# Patient Record
Sex: FEMALE | Race: WHITE | URBAN - METROPOLITAN AREA
[De-identification: names, ages, dates, MRNs, and addresses within clinical notes are randomized per-mention and may not be internally consistent; named-entity substitution may affect disease eponyms.]

---

## 2023-03-03 LAB
C. TRACHOMATIS, EXTERNAL RESULT: NEGATIVE
N. GONORRHOEAE, EXTERNAL RESULT: NEGATIVE

## 2023-04-03 LAB
ABO, EXTERNAL RESULT: NORMAL
HEP B, EXTERNAL RESULT: NEGATIVE
HEPATITIS C ANTIBODY, EXTERNAL RESULT: NEGATIVE
HIV, EXTERNAL RESULT: NEGATIVE
RH FACTOR, EXTERNAL RESULT: POSITIVE
RPR, EXTERNAL RESULT: NON REACTIVE
RUBELLA TITER, EXTERNAL RESULT: NORMAL

## 2023-09-12 LAB — GBS, EXTERNAL RESULT: NEGATIVE

## 2023-10-06 ENCOUNTER — HOSPITAL ENCOUNTER (INPATIENT)
Age: 33
LOS: 3 days | Discharge: HOME | End: 2023-10-09
Attending: OBSTETRICS & GYNECOLOGY | Admitting: OBSTETRICS & GYNECOLOGY
Payer: COMMERCIAL

## 2023-10-06 ENCOUNTER — ANESTHESIA (OUTPATIENT)
Dept: LABOR AND DELIVERY | Age: 33
End: 2023-10-06
Payer: COMMERCIAL

## 2023-10-06 ENCOUNTER — ANESTHESIA EVENT (OUTPATIENT)
Dept: LABOR AND DELIVERY | Age: 33
End: 2023-10-06
Payer: COMMERCIAL

## 2023-10-06 PROBLEM — O36.8390 NON-REASSURING FETAL HEART TONES COMPLICATING PREGNANCY, ANTEPARTUM: Status: ACTIVE | Noted: 2023-10-06

## 2023-10-06 LAB
ABO + RH BLD: NORMAL
ALBUMIN SERPL-MCNC: 3.2 G/DL (ref 3.4–5)
ALBUMIN/GLOB SERPL: 0.8 {RATIO} (ref 1.1–2.2)
ALP SERPL-CCNC: 142 U/L (ref 40–129)
ALT SERPL-CCNC: <5 U/L (ref 10–40)
AMPHETAMINES UR QL SCN>1000 NG/ML: ABNORMAL
ANION GAP SERPL CALCULATED.3IONS-SCNC: 13 MMOL/L (ref 3–16)
AST SERPL-CCNC: 12 U/L (ref 15–37)
BARBITURATES UR QL SCN>200 NG/ML: ABNORMAL
BENZODIAZ UR QL SCN>200 NG/ML: ABNORMAL
BILIRUB SERPL-MCNC: <0.2 MG/DL (ref 0–1)
BLD GP AB SCN SERPL QL: NORMAL
BUN SERPL-MCNC: 9 MG/DL (ref 7–20)
BUPRENORPHINE+NOR UR QL SCN: ABNORMAL
CALCIUM SERPL-MCNC: 9.1 MG/DL (ref 8.3–10.6)
CANNABINOIDS UR QL SCN>50 NG/ML: ABNORMAL
CHLORIDE SERPL-SCNC: 102 MMOL/L (ref 99–110)
CO2 SERPL-SCNC: 21 MMOL/L (ref 21–32)
COCAINE UR QL SCN: ABNORMAL
CREAT SERPL-MCNC: 0.7 MG/DL (ref 0.6–1.1)
DEPRECATED RDW RBC AUTO: 14.1 % (ref 12.4–15.4)
DRUG SCREEN COMMENT UR-IMP: ABNORMAL
FENTANYL SCREEN, URINE: POSITIVE
GFR SERPLBLD CREATININE-BSD FMLA CKD-EPI: >60 ML/MIN/{1.73_M2}
GLUCOSE SERPL-MCNC: 95 MG/DL (ref 70–99)
HCT VFR BLD AUTO: 31.6 % (ref 36–48)
HGB BLD-MCNC: 10.2 G/DL (ref 12–16)
MCH RBC QN AUTO: 26.9 PG (ref 26–34)
MCHC RBC AUTO-ENTMCNC: 32.2 G/DL (ref 31–36)
MCV RBC AUTO: 83.7 FL (ref 80–100)
METHADONE UR QL SCN>300 NG/ML: ABNORMAL
OPIATES UR QL SCN>300 NG/ML: ABNORMAL
OXYCODONE UR QL SCN: ABNORMAL
PCP UR QL SCN>25 NG/ML: ABNORMAL
PH UR STRIP: 6 [PH]
PLATELET # BLD AUTO: 301 K/UL (ref 135–450)
PMV BLD AUTO: 8.4 FL (ref 5–10.5)
POTASSIUM SERPL-SCNC: 4.3 MMOL/L (ref 3.5–5.1)
PROT SERPL-MCNC: 7.3 G/DL (ref 6.4–8.2)
RBC # BLD AUTO: 3.78 M/UL (ref 4–5.2)
SODIUM SERPL-SCNC: 136 MMOL/L (ref 136–145)
WBC # BLD AUTO: 9.2 K/UL (ref 4–11)

## 2023-10-06 PROCEDURE — 85027 COMPLETE CBC AUTOMATED: CPT

## 2023-10-06 PROCEDURE — 6360000002 HC RX W HCPCS: Performed by: NURSE ANESTHETIST, CERTIFIED REGISTERED

## 2023-10-06 PROCEDURE — 86780 TREPONEMA PALLIDUM: CPT

## 2023-10-06 PROCEDURE — 2580000003 HC RX 258: Performed by: NURSE ANESTHETIST, CERTIFIED REGISTERED

## 2023-10-06 PROCEDURE — 80053 COMPREHEN METABOLIC PANEL: CPT

## 2023-10-06 PROCEDURE — 86850 RBC ANTIBODY SCREEN: CPT

## 2023-10-06 PROCEDURE — 3609079900 HC CESAREAN SECTION: Performed by: OBSTETRICS & GYNECOLOGY

## 2023-10-06 PROCEDURE — 7100000001 HC PACU RECOVERY - ADDTL 15 MIN: Performed by: OBSTETRICS & GYNECOLOGY

## 2023-10-06 PROCEDURE — 86900 BLOOD TYPING SEROLOGIC ABO: CPT

## 2023-10-06 PROCEDURE — 2580000003 HC RX 258: Performed by: OBSTETRICS & GYNECOLOGY

## 2023-10-06 PROCEDURE — 86901 BLOOD TYPING SEROLOGIC RH(D): CPT

## 2023-10-06 PROCEDURE — 6370000000 HC RX 637 (ALT 250 FOR IP): Performed by: OBSTETRICS & GYNECOLOGY

## 2023-10-06 PROCEDURE — 2709999900 HC NON-CHARGEABLE SUPPLY: Performed by: OBSTETRICS & GYNECOLOGY

## 2023-10-06 PROCEDURE — 6360000002 HC RX W HCPCS

## 2023-10-06 PROCEDURE — 7100000000 HC PACU RECOVERY - FIRST 15 MIN: Performed by: OBSTETRICS & GYNECOLOGY

## 2023-10-06 PROCEDURE — 80307 DRUG TEST PRSMV CHEM ANLYZR: CPT

## 2023-10-06 PROCEDURE — 3700000001 HC ADD 15 MINUTES (ANESTHESIA): Performed by: OBSTETRICS & GYNECOLOGY

## 2023-10-06 PROCEDURE — 3700000000 HC ANESTHESIA ATTENDED CARE: Performed by: OBSTETRICS & GYNECOLOGY

## 2023-10-06 PROCEDURE — 1220000000 HC SEMI PRIVATE OB R&B

## 2023-10-06 PROCEDURE — 88307 TISSUE EXAM BY PATHOLOGIST: CPT

## 2023-10-06 PROCEDURE — 6360000002 HC RX W HCPCS: Performed by: OBSTETRICS & GYNECOLOGY

## 2023-10-06 RX ORDER — KETOROLAC TROMETHAMINE 30 MG/ML
INJECTION, SOLUTION INTRAMUSCULAR; INTRAVENOUS
Status: COMPLETED
Start: 2023-10-06 | End: 2023-10-06

## 2023-10-06 RX ORDER — SODIUM CHLORIDE, SODIUM LACTATE, POTASSIUM CHLORIDE, AND CALCIUM CHLORIDE .6; .31; .03; .02 G/100ML; G/100ML; G/100ML; G/100ML
1000 INJECTION, SOLUTION INTRAVENOUS ONCE
Status: DISCONTINUED | OUTPATIENT
Start: 2023-10-06 | End: 2023-10-06

## 2023-10-06 RX ORDER — SODIUM CHLORIDE 0.9 % (FLUSH) 0.9 %
5-40 SYRINGE (ML) INJECTION EVERY 12 HOURS SCHEDULED
Status: DISCONTINUED | OUTPATIENT
Start: 2023-10-06 | End: 2023-10-09 | Stop reason: HOSPADM

## 2023-10-06 RX ORDER — HYDROXYZINE PAMOATE 25 MG/1
25 CAPSULE ORAL 3 TIMES DAILY PRN
Status: DISCONTINUED | OUTPATIENT
Start: 2023-10-06 | End: 2023-10-09 | Stop reason: HOSPADM

## 2023-10-06 RX ORDER — SODIUM CHLORIDE 0.9 % (FLUSH) 0.9 %
5-40 SYRINGE (ML) INJECTION PRN
Status: DISCONTINUED | OUTPATIENT
Start: 2023-10-06 | End: 2023-10-09 | Stop reason: HOSPADM

## 2023-10-06 RX ORDER — IBUPROFEN 600 MG/1
600 TABLET ORAL EVERY 8 HOURS SCHEDULED
Status: DISCONTINUED | OUTPATIENT
Start: 2023-10-06 | End: 2023-10-09 | Stop reason: HOSPADM

## 2023-10-06 RX ORDER — ONDANSETRON 2 MG/ML
4 INJECTION INTRAMUSCULAR; INTRAVENOUS EVERY 6 HOURS PRN
Status: DISCONTINUED | OUTPATIENT
Start: 2023-10-06 | End: 2023-10-06

## 2023-10-06 RX ORDER — OXYCODONE HYDROCHLORIDE 5 MG/1
10 TABLET ORAL EVERY 4 HOURS PRN
Status: DISCONTINUED | OUTPATIENT
Start: 2023-10-06 | End: 2023-10-09 | Stop reason: HOSPADM

## 2023-10-06 RX ORDER — SODIUM CHLORIDE 0.9 % (FLUSH) 0.9 %
10 SYRINGE (ML) INJECTION EVERY 12 HOURS SCHEDULED
Status: DISCONTINUED | OUTPATIENT
Start: 2023-10-06 | End: 2023-10-06

## 2023-10-06 RX ORDER — ONDANSETRON 2 MG/ML
4 INJECTION INTRAMUSCULAR; INTRAVENOUS EVERY 6 HOURS PRN
Status: DISCONTINUED | OUTPATIENT
Start: 2023-10-06 | End: 2023-10-09 | Stop reason: HOSPADM

## 2023-10-06 RX ORDER — SODIUM CHLORIDE 9 MG/ML
INJECTION, SOLUTION INTRAVENOUS PRN
Status: DISCONTINUED | OUTPATIENT
Start: 2023-10-06 | End: 2023-10-09 | Stop reason: HOSPADM

## 2023-10-06 RX ORDER — MORPHINE SULFATE 0.5 MG/ML
INJECTION, SOLUTION EPIDURAL; INTRATHECAL; INTRAVENOUS PRN
Status: DISCONTINUED | OUTPATIENT
Start: 2023-10-06 | End: 2023-10-06 | Stop reason: SDUPTHER

## 2023-10-06 RX ORDER — DOCUSATE SODIUM 100 MG/1
100 CAPSULE, LIQUID FILLED ORAL 2 TIMES DAILY PRN
Status: DISCONTINUED | OUTPATIENT
Start: 2023-10-06 | End: 2023-10-09 | Stop reason: HOSPADM

## 2023-10-06 RX ORDER — LANOLIN 100 %
OINTMENT (GRAM) TOPICAL
Status: DISCONTINUED | OUTPATIENT
Start: 2023-10-06 | End: 2023-10-09 | Stop reason: HOSPADM

## 2023-10-06 RX ORDER — BUPIVACAINE HYDROCHLORIDE 7.5 MG/ML
INJECTION, SOLUTION INTRASPINAL PRN
Status: DISCONTINUED | OUTPATIENT
Start: 2023-10-06 | End: 2023-10-06 | Stop reason: SDUPTHER

## 2023-10-06 RX ORDER — PRENATAL WITH FERROUS FUM AND FOLIC ACID 3080; 920; 120; 400; 22; 1.84; 3; 20; 10; 1; 12; 200; 27; 25; 2 [IU]/1; [IU]/1; MG/1; [IU]/1; MG/1; MG/1; MG/1; MG/1; MG/1; MG/1; UG/1; MG/1; MG/1; MG/1; MG/1
1 TABLET ORAL DAILY
Status: DISCONTINUED | OUTPATIENT
Start: 2023-10-07 | End: 2023-10-09 | Stop reason: HOSPADM

## 2023-10-06 RX ORDER — DIPHENHYDRAMINE HYDROCHLORIDE 50 MG/ML
25 INJECTION INTRAMUSCULAR; INTRAVENOUS EVERY 6 HOURS PRN
Status: DISCONTINUED | OUTPATIENT
Start: 2023-10-06 | End: 2023-10-09 | Stop reason: HOSPADM

## 2023-10-06 RX ORDER — SIMETHICONE 80 MG
80 TABLET,CHEWABLE ORAL EVERY 6 HOURS PRN
Status: DISCONTINUED | OUTPATIENT
Start: 2023-10-06 | End: 2023-10-09 | Stop reason: HOSPADM

## 2023-10-06 RX ORDER — SODIUM CHLORIDE 9 MG/ML
INJECTION, SOLUTION INTRAVENOUS PRN
Status: DISCONTINUED | OUTPATIENT
Start: 2023-10-06 | End: 2023-10-06

## 2023-10-06 RX ORDER — SODIUM CHLORIDE 0.9 % (FLUSH) 0.9 %
10 SYRINGE (ML) INJECTION PRN
Status: DISCONTINUED | OUTPATIENT
Start: 2023-10-06 | End: 2023-10-06

## 2023-10-06 RX ORDER — ACETAMINOPHEN 500 MG
1000 TABLET ORAL EVERY 8 HOURS SCHEDULED
Status: DISCONTINUED | OUTPATIENT
Start: 2023-10-06 | End: 2023-10-09 | Stop reason: HOSPADM

## 2023-10-06 RX ORDER — OXYCODONE HYDROCHLORIDE 5 MG/1
5 TABLET ORAL EVERY 4 HOURS PRN
Status: DISCONTINUED | OUTPATIENT
Start: 2023-10-06 | End: 2023-10-09 | Stop reason: HOSPADM

## 2023-10-06 RX ORDER — SODIUM CHLORIDE, SODIUM LACTATE, POTASSIUM CHLORIDE, CALCIUM CHLORIDE 600; 310; 30; 20 MG/100ML; MG/100ML; MG/100ML; MG/100ML
INJECTION, SOLUTION INTRAVENOUS CONTINUOUS
Status: DISCONTINUED | OUTPATIENT
Start: 2023-10-06 | End: 2023-10-09 | Stop reason: HOSPADM

## 2023-10-06 RX ORDER — SODIUM CHLORIDE, SODIUM LACTATE, POTASSIUM CHLORIDE, CALCIUM CHLORIDE 600; 310; 30; 20 MG/100ML; MG/100ML; MG/100ML; MG/100ML
INJECTION, SOLUTION INTRAVENOUS CONTINUOUS
Status: DISCONTINUED | OUTPATIENT
Start: 2023-10-06 | End: 2023-10-06

## 2023-10-06 RX ORDER — SODIUM CHLORIDE, SODIUM LACTATE, POTASSIUM CHLORIDE, CALCIUM CHLORIDE 600; 310; 30; 20 MG/100ML; MG/100ML; MG/100ML; MG/100ML
INJECTION, SOLUTION INTRAVENOUS CONTINUOUS PRN
Status: DISCONTINUED | OUTPATIENT
Start: 2023-10-06 | End: 2023-10-06 | Stop reason: SDUPTHER

## 2023-10-06 RX ORDER — OXYTOCIN 10 [USP'U]/ML
INJECTION, SOLUTION INTRAMUSCULAR; INTRAVENOUS PRN
Status: DISCONTINUED | OUTPATIENT
Start: 2023-10-06 | End: 2023-10-06 | Stop reason: SDUPTHER

## 2023-10-06 RX ORDER — PHENYLEPHRINE HCL IN 0.9% NACL 1 MG/10 ML
SYRINGE (ML) INTRAVENOUS PRN
Status: DISCONTINUED | OUTPATIENT
Start: 2023-10-06 | End: 2023-10-06 | Stop reason: SDUPTHER

## 2023-10-06 RX ORDER — FERROUS SULFATE 325(65) MG
325 TABLET ORAL 2 TIMES DAILY WITH MEALS
Status: DISCONTINUED | OUTPATIENT
Start: 2023-10-07 | End: 2023-10-09 | Stop reason: HOSPADM

## 2023-10-06 RX ADMIN — Medication 100 MCG: at 20:08

## 2023-10-06 RX ADMIN — MORPHINE SULFATE 0.2 MG: 0.5 INJECTION EPIDURAL; INTRATHECAL; INTRAVENOUS at 19:36

## 2023-10-06 RX ADMIN — Medication 3000 MG: at 19:43

## 2023-10-06 RX ADMIN — KETOROLAC TROMETHAMINE 30 MG: 30 INJECTION, SOLUTION INTRAMUSCULAR; INTRAVENOUS at 20:56

## 2023-10-06 RX ADMIN — OXYTOCIN 20 UNITS: 10 INJECTION INTRAVENOUS at 19:53

## 2023-10-06 RX ADMIN — HYDROXYZINE PAMOATE 25 MG: 25 CAPSULE ORAL at 22:39

## 2023-10-06 RX ADMIN — SODIUM CHLORIDE, SODIUM LACTATE, POTASSIUM CHLORIDE, AND CALCIUM CHLORIDE: .6; .31; .03; .02 INJECTION, SOLUTION INTRAVENOUS at 20:21

## 2023-10-06 RX ADMIN — ACETAMINOPHEN 1000 MG: 500 TABLET ORAL at 22:39

## 2023-10-06 RX ADMIN — SODIUM CHLORIDE, SODIUM LACTATE, POTASSIUM CHLORIDE, AND CALCIUM CHLORIDE: .6; .31; .03; .02 INJECTION, SOLUTION INTRAVENOUS at 19:53

## 2023-10-06 RX ADMIN — BUPIVACAINE HYDROCHLORIDE 1.8 ML: 7.5 INJECTION, SOLUTION SUBARACHNOID at 19:36

## 2023-10-06 RX ADMIN — SODIUM CHLORIDE, SODIUM LACTATE, POTASSIUM CHLORIDE, AND CALCIUM CHLORIDE: .6; .31; .03; .02 INJECTION, SOLUTION INTRAVENOUS at 19:36

## 2023-10-06 RX ADMIN — SODIUM CHLORIDE, POTASSIUM CHLORIDE, SODIUM LACTATE AND CALCIUM CHLORIDE: 600; 310; 30; 20 INJECTION, SOLUTION INTRAVENOUS at 22:40

## 2023-10-06 RX ADMIN — OXYTOCIN 10 UNITS: 10 INJECTION INTRAVENOUS at 20:21

## 2023-10-06 ASSESSMENT — PAIN SCALES - GENERAL
PAINLEVEL_OUTOF10: 2
PAINLEVEL_OUTOF10: 4

## 2023-10-06 NOTE — ANESTHESIA PROCEDURE NOTES
Spinal Block    Patient location during procedure: OR  End time: 10/6/2023 7:36 PM  Reason for block: primary anesthetic  Staffing  Performed: resident/CRNA   Resident/CRNA: DONNELL Ferro - PATRICIA  Performed by: DONNELL Ferro - CRNA  Authorized by: Carlos Grey MD    Spinal Block  Patient position: sitting  Prep: ChloraPrep  Patient monitoring: cardiac monitor, continuous pulse ox and frequent blood pressure checks  Approach: midline  Location: L3/L4  Provider prep: mask  Local infiltration: lidocaine  Needle  Needle type: Pencan   Needle gauge: 24 G  Needle length: 4 in  Assessment  Sensory level: T6  Swirl obtained: Yes  CSF: clear  Attempts: 1  Hemodynamics: stable  Additional Notes  Pt.  Sitting, sterile prep/drape, 1%Lido @ L3-4, 24ga pencil point needle inserted via introducer, positive clear, free flowing CSF x 4 quad, 1.8ml 0.75% spinal marcaine and duramorph 0.2mg intrathecally   Preanesthetic Checklist  Completed: patient identified, IV checked, site marked, risks and benefits discussed, surgical/procedural consents, equipment checked, pre-op evaluation, timeout performed, anesthesia consent given, oxygen available and monitors applied/VS acknowledged

## 2023-10-07 LAB
DEPRECATED RDW RBC AUTO: 14.7 % (ref 12.4–15.4)
HCT VFR BLD AUTO: 30.5 % (ref 36–48)
HGB BLD-MCNC: 10 G/DL (ref 12–16)
MCH RBC QN AUTO: 27.2 PG (ref 26–34)
MCHC RBC AUTO-ENTMCNC: 32.8 G/DL (ref 31–36)
MCV RBC AUTO: 83.1 FL (ref 80–100)
PLATELET # BLD AUTO: 293 K/UL (ref 135–450)
PMV BLD AUTO: 8 FL (ref 5–10.5)
RBC # BLD AUTO: 3.67 M/UL (ref 4–5.2)
REAGIN+T PALLIDUM IGG+IGM SERPL-IMP: NORMAL
WBC # BLD AUTO: 10.5 K/UL (ref 4–11)

## 2023-10-07 PROCEDURE — 6360000002 HC RX W HCPCS: Performed by: OBSTETRICS & GYNECOLOGY

## 2023-10-07 PROCEDURE — 1220000000 HC SEMI PRIVATE OB R&B

## 2023-10-07 PROCEDURE — 2580000003 HC RX 258: Performed by: OBSTETRICS & GYNECOLOGY

## 2023-10-07 PROCEDURE — 36415 COLL VENOUS BLD VENIPUNCTURE: CPT

## 2023-10-07 PROCEDURE — 6370000000 HC RX 637 (ALT 250 FOR IP): Performed by: OBSTETRICS & GYNECOLOGY

## 2023-10-07 PROCEDURE — 85027 COMPLETE CBC AUTOMATED: CPT

## 2023-10-07 RX ADMIN — ACETAMINOPHEN 1000 MG: 500 TABLET ORAL at 06:39

## 2023-10-07 RX ADMIN — HYDROXYZINE PAMOATE 25 MG: 25 CAPSULE ORAL at 11:19

## 2023-10-07 RX ADMIN — HYDROXYZINE PAMOATE 25 MG: 25 CAPSULE ORAL at 06:40

## 2023-10-07 RX ADMIN — SODIUM CHLORIDE, POTASSIUM CHLORIDE, SODIUM LACTATE AND CALCIUM CHLORIDE: 600; 310; 30; 20 INJECTION, SOLUTION INTRAVENOUS at 06:38

## 2023-10-07 RX ADMIN — OXYCODONE 5 MG: 5 TABLET ORAL at 11:19

## 2023-10-07 RX ADMIN — HYDROXYZINE PAMOATE 25 MG: 25 CAPSULE ORAL at 18:10

## 2023-10-07 RX ADMIN — IBUPROFEN 600 MG: 600 TABLET, FILM COATED ORAL at 23:31

## 2023-10-07 RX ADMIN — ACETAMINOPHEN 1000 MG: 500 TABLET ORAL at 18:10

## 2023-10-07 RX ADMIN — OXYCODONE 5 MG: 5 TABLET ORAL at 20:40

## 2023-10-07 RX ADMIN — OXYCODONE 5 MG: 5 TABLET ORAL at 01:43

## 2023-10-07 RX ADMIN — DOCUSATE SODIUM 100 MG: 100 CAPSULE, LIQUID FILLED ORAL at 20:41

## 2023-10-07 RX ADMIN — IBUPROFEN 600 MG: 600 TABLET, FILM COATED ORAL at 15:00

## 2023-10-07 RX ADMIN — Medication 10 ML: at 18:12

## 2023-10-07 RX ADMIN — Medication 10 ML: at 20:41

## 2023-10-07 RX ADMIN — ONDANSETRON 4 MG: 2 INJECTION INTRAMUSCULAR; INTRAVENOUS at 01:50

## 2023-10-07 RX ADMIN — DOCUSATE SODIUM 100 MG: 100 CAPSULE, LIQUID FILLED ORAL at 11:19

## 2023-10-07 RX ADMIN — IBUPROFEN 600 MG: 600 TABLET, FILM COATED ORAL at 06:39

## 2023-10-07 ASSESSMENT — PAIN SCALES - GENERAL
PAINLEVEL_OUTOF10: 5
PAINLEVEL_OUTOF10: 2
PAINLEVEL_OUTOF10: 4
PAINLEVEL_OUTOF10: 3

## 2023-10-07 NOTE — OP NOTE
Uterus was replaced into the abdominal cavity. Gutters were cleared off clots and debris using wet lap sponge. The  incision was reinspected and noted to be hemostatic. The undersides of  the fascia and the rectus muscle were noted to be hemostatic and the  fascia was closed using 0 Maxon in a running continuous fashion. The  subcu was irrigated and then closed using 3-0 Vicryl and the skin was  closed using 4-0 Monocryl in a subcuticular fashion. Skin glue was  placed over the incision. The patient tolerated the procedure well. All counts were correct x2. The patient was taken to the recovery room  in stable condition.         Cassie Forte MD    D: 10/06/2023 22:08:32       T: 10/06/2023 23:16:50     [id number]  Job#: [id number]     Doc#: [id number]    CC:

## 2023-10-07 NOTE — LACTATION NOTE
This note was copied from a baby's chart. Lactation Progress Note      Data:    Initial consult for multip on day 1po with an infant born at 39.5 weeks gestation. VICKY reports breastfeeding has been going ok and she is using breast pump after feed (per her choice) and feeding expressed colostrum back to infant because she doesn't want to have to give formula. VICKY breast fed her first for 6 weeks but was very young. Action:    Introduced self to patient as lactation, name and phone number written on white board in room. Educated mother about what to expect over the next  24-48 hours with infant feedings, infant output, how to know infant is getting enough, the importance of a deep latch and how to achieve it, how to break suction and try again if latch is shallow, normal  behavior, how to wake a sleepy infant to feed, how the breasts work to make milk, protecting milk supply, breastfeeding recommendations for exclusivity and duration, what to expect with cluster feeding, how to hand express colostrum, and breast care. Reviewed breast pump education and use. Reviewed infant feeding cues and encouraged mother to allow infant to breast feed on demand anytime feeding cues are shown and if no feeding cues are shown to attempt to wake infant to feed every 2-3 hours. If infant is still too sleepy to latch to hand express colostrum into infants mouth for about ten minutes, then try again in 2-3 hours. After the first day of life to breast feed a minimum of 8-12 times a day per 24 hour period. Also encouraged mother to avoid giving infant a pacifier, bottle, or pump for at least the first two weeks of life or until breast feeding is well established. Encouraged good hydration, nutrition, and rest, and to keep taking prenatal or multivitamin while lactating. Encouraged much skin to skin between mother and infant and father and infant. Breast feeding log reviewed, all questions answered.  Mother

## 2023-10-07 NOTE — PLAN OF CARE
Problem: Pain  Goal: Verbalizes/displays adequate comfort level or baseline comfort level  10/7/2023 1944 by Eriberto Simental RN  Outcome: Progressing  10/7/2023 1848 by Tara Diaz RN  Outcome: Progressing     Problem: Postpartum  Goal: Experiences normal postpartum course  Description:  Postpartum OB-Pregnancy care plan goal which identifies if the mother is experiencing a normal postpartum course  10/7/2023 1944 by Eriberto Simental RN  Outcome: Progressing  10/7/2023 1850 by Tara Diaz RN  Outcome: Progressing  10/7/2023 1848 by Tara Diaz RN  Outcome: Progressing  Goal: Appropriate maternal -  bonding  Description:  Postpartum OB-Pregnancy care plan goal which identifies if the mother and  are bonding appropriately  10/7/2023 1944 by Eriberto Simental RN  Outcome: Progressing  10/7/2023 1850 by Tara Diaz RN  Outcome: Progressing  10/7/2023 1848 by Tara Diaz RN  Outcome: Progressing  Goal: Establishment of infant feeding pattern  Description:  Postpartum OB-Pregnancy care plan goal which identifies if the mother is establishing a feeding pattern with their   10/7/2023 1944 by Eriberto Simental RN  Outcome: Progressing  10/7/2023 1850 by Tara Diaz RN  Outcome: Progressing  10/7/2023 1848 by Tara Diaz RN  Outcome: Progressing  Goal: Incisions, wounds, or drain sites healing without S/S of infection  10/7/2023 1944 by Eriberto Simental RN  Outcome: Progressing  10/7/2023 1850 by Tara Diaz RN  Outcome: Progressing  10/7/2023 1848 by Tara Diaz RN  Outcome: Progressing     Problem: Infection - Adult  Goal: Absence of infection at discharge  10/7/2023 1944 by Eriberto Simental RN  Outcome: Progressing  10/7/2023 1848 by Tara Diaz RN  Outcome: Progressing  Goal: Absence of infection during hospitalization  10/7/2023 1944 by Eriberto Simental RN  Outcome: Progressing  10/7/2023 1848 by Tara Diaz RN  Outcome: Progressing  Goal: Absence of fever/infection

## 2023-10-07 NOTE — ANESTHESIA POSTPROCEDURE EVALUATION
Department of Anesthesiology  Postprocedure Note    Patient: Sebastian Lewis  MRN: [id number]  YOB: 1990  Date of evaluation: 10/7/2023      Procedure Summary     Date: 10/06/23 Room / Location: [facility] L&D OR Lev  Chris Holman    Anesthesia Start:  Anesthesia Stop:     Procedure:  SECTION (Abdomen) Diagnosis:       Non-reassuring fetal heart rate with late deceleration      (Non-reassuring fetal heart rate with late deceleration [D49.1655])    Surgeons: Byron Wray MD Responsible Provider: Carlos Grey MD    Anesthesia Type: spinal ASA Status: 2 - Emergent          Anesthesia Type: No value filed.     Ruby Phase I: Ruby Score: 9    Ruby Phase II: Ruby Score: 9      Anesthesia Post Evaluation    Patient location during evaluation: bedside  Patient participation: complete - patient participated  Level of consciousness: awake and alert  Pain score: 1  Airway patency: patent  Nausea & Vomiting: no nausea and no vomiting  Complications: no  Cardiovascular status: blood pressure returned to baseline and hemodynamically stable  Respiratory status: acceptable and room air  Hydration status: stable  Pain management: adequate

## 2023-10-07 NOTE — BRIEF OP NOTE
Brief Postoperative Note      Patient: Nanda Mendez  YOB: 1990  MRN: [id number]    Date of Procedure: 10/6/2023    Pre-Op Diagnosis Codes:  Angelica Edward at 39wk5d decreased FM   Nonreassuring FHTs    Post-Op Diagnosis: Same       Procedure(s):  Primary low transverse  SECTION    Surgeon(s):  Jody Vines MD    Assistant:  Cruz Song DO    Anesthesia: Spinal    Estimated Blood Loss (mL): 796M QBL    Complications: None    Specimens:   * No specimens in log *    Implants:  * No implants in log *      Drains:   Urinary Catheter 10/06/23 Samaniego (Active)       Findings: liveborn male infant , cephalic presentation. Lose nuchal X 1 . Meconium stained fluid. Apgars /6/8. Baby to SCN - CPAP. Placenta 3vc Intact. Sent to pathology . Fallopian tubes and ovaries wnl bilaterally.      Cord gases collected     Dictation#: [id number]      Electronically signed by Jody Vines MD on 10/6/2023 at 9:21 PM

## 2023-10-07 NOTE — PLAN OF CARE
Problem: Pain  Goal: Verbalizes/displays adequate comfort level or baseline comfort level  Outcome: Progressing     Problem: Postpartum  Goal: Experiences normal postpartum course  Description:  Postpartum OB-Pregnancy care plan goal which identifies if the mother is experiencing a normal postpartum course  10/7/2023 1850 by Juan Manuel Skaggs RN  Outcome: Progressing  10/7/2023 1848 by Juan Manuel Skaggs RN  Outcome: Progressing  Goal: Appropriate maternal -  bonding  Description:  Postpartum OB-Pregnancy care plan goal which identifies if the mother and  are bonding appropriately  10/7/2023 1850 by Juan Manuel Skaggs RN  Outcome: Progressing  10/7/2023 1848 by Juan Manuel Skaggs RN  Outcome: Progressing  Goal: Establishment of infant feeding pattern  Description:  Postpartum OB-Pregnancy care plan goal which identifies if the mother is establishing a feeding pattern with their   10/7/2023 1850 by Juan Manuel Skaggs RN  Outcome: Progressing  10/7/2023 1848 by Juan Manuel Skaggs RN  Outcome: Progressing  Goal: Incisions, wounds, or drain sites healing without S/S of infection  10/7/2023 1850 by Juan Manuel Skaggs RN  Outcome: Progressing  10/7/2023 1848 by Juan Manuel Skaggs RN  Outcome: Progressing     Problem: Infection - Adult  Goal: Absence of infection at discharge  Outcome: Progressing  Goal: Absence of infection during hospitalization  Outcome: Progressing  Goal: Absence of fever/infection during anticipated neutropenic period  Outcome: Progressing     Problem: Safety - Adult  Goal: Free from fall injury  10/7/2023 1850 by Juan Manuel Skaggs RN  Outcome: Progressing  10/7/2023 1848 by Juan Manuel Skaggs RN  Outcome: Progressing     Problem: Discharge Planning  Goal: Discharge to home or other facility with appropriate resources  Outcome: Progressing     Problem: Chronic Conditions and Co-morbidities  Goal: Patient's chronic conditions and co-morbidity symptoms are monitored and maintained or

## 2023-10-07 NOTE — PLAN OF CARE
Problem: Pain  Goal: Verbalizes/displays adequate comfort level or baseline comfort level  Outcome: Progressing     Problem: Postpartum  Goal: Experiences normal postpartum course  Description:  Postpartum OB-Pregnancy care plan goal which identifies if the mother is experiencing a normal postpartum course  Outcome: Progressing  Goal: Appropriate maternal -  bonding  Description:  Postpartum OB-Pregnancy care plan goal which identifies if the mother and  are bonding appropriately  Outcome: Progressing  Goal: Establishment of infant feeding pattern  Description:  Postpartum OB-Pregnancy care plan goal which identifies if the mother is establishing a feeding pattern with their   Outcome: Progressing  Goal: Incisions, wounds, or drain sites healing without S/S of infection  Outcome: Progressing     Problem: Infection - Adult  Goal: Absence of infection at discharge  Outcome: Progressing  Goal: Absence of infection during hospitalization  Outcome: Progressing  Goal: Absence of fever/infection during anticipated neutropenic period  Outcome: Progressing     Problem: Safety - Adult  Goal: Free from fall injury  Outcome: Progressing     Problem: Discharge Planning  Goal: Discharge to home or other facility with appropriate resources  Outcome: Progressing     Problem: Chronic Conditions and Co-morbidities  Goal: Patient's chronic conditions and co-morbidity symptoms are monitored and maintained or improved  Outcome: Progressing

## 2023-10-08 PROCEDURE — 6370000000 HC RX 637 (ALT 250 FOR IP): Performed by: OBSTETRICS & GYNECOLOGY

## 2023-10-08 PROCEDURE — 1220000000 HC SEMI PRIVATE OB R&B

## 2023-10-08 PROCEDURE — 2580000003 HC RX 258: Performed by: OBSTETRICS & GYNECOLOGY

## 2023-10-08 RX ADMIN — OXYCODONE 5 MG: 5 TABLET ORAL at 03:33

## 2023-10-08 RX ADMIN — IBUPROFEN 600 MG: 600 TABLET, FILM COATED ORAL at 17:01

## 2023-10-08 RX ADMIN — ACETAMINOPHEN 1000 MG: 500 TABLET ORAL at 03:30

## 2023-10-08 RX ADMIN — ACETAMINOPHEN 1000 MG: 500 TABLET ORAL at 18:31

## 2023-10-08 RX ADMIN — OXYCODONE 5 MG: 5 TABLET ORAL at 15:06

## 2023-10-08 RX ADMIN — DOCUSATE SODIUM 100 MG: 100 CAPSULE, LIQUID FILLED ORAL at 20:17

## 2023-10-08 RX ADMIN — OXYCODONE HYDROCHLORIDE 10 MG: 5 TABLET ORAL at 20:16

## 2023-10-08 RX ADMIN — IBUPROFEN 600 MG: 600 TABLET, FILM COATED ORAL at 09:32

## 2023-10-08 RX ADMIN — ACETAMINOPHEN 1000 MG: 500 TABLET ORAL at 10:57

## 2023-10-08 RX ADMIN — Medication 10 ML: at 09:39

## 2023-10-08 RX ADMIN — DOCUSATE SODIUM 100 MG: 100 CAPSULE, LIQUID FILLED ORAL at 09:32

## 2023-10-08 RX ADMIN — PRENATAL WITH FERROUS FUM AND FOLIC ACID 1 TABLET: 3080; 920; 120; 400; 22; 1.84; 3; 20; 10; 1; 12; 200; 27; 25; 2 TABLET ORAL at 09:32

## 2023-10-08 RX ADMIN — OXYCODONE 5 MG: 5 TABLET ORAL at 10:57

## 2023-10-08 ASSESSMENT — PAIN SCALES - GENERAL
PAINLEVEL_OUTOF10: 3
PAINLEVEL_OUTOF10: 4
PAINLEVEL_OUTOF10: 5
PAINLEVEL_OUTOF10: 4
PAINLEVEL_OUTOF10: 5
PAINLEVEL_OUTOF10: 4
PAINLEVEL_OUTOF10: 2
PAINLEVEL_OUTOF10: 3

## 2023-10-08 ASSESSMENT — PAIN DESCRIPTION - ORIENTATION
ORIENTATION: LOWER
ORIENTATION: MID;LOWER
ORIENTATION: LOWER;MID

## 2023-10-08 ASSESSMENT — PAIN - FUNCTIONAL ASSESSMENT
PAIN_FUNCTIONAL_ASSESSMENT: ACTIVITIES ARE NOT PREVENTED

## 2023-10-08 ASSESSMENT — PAIN DESCRIPTION - DESCRIPTORS
DESCRIPTORS: CRAMPING
DESCRIPTORS: DISCOMFORT;SORE
DESCRIPTORS: DISCOMFORT
DESCRIPTORS: DISCOMFORT

## 2023-10-08 ASSESSMENT — PAIN DESCRIPTION - LOCATION
LOCATION: ABDOMEN
LOCATION: ABDOMEN;INCISION

## 2023-10-08 NOTE — LACTATION NOTE
This note was copied from a baby's chart. Lactation Progress Note      Data:    Follow up consult for multip on day 2 po with an infant born at 39.5 weeks gestation. MOB breast fed her first for 6 weeks but lost supply when she went back to work. Current infant is in SCN due to low blood sugars and hypothermia. MOB has been set up with a breast pump and is using q 3 h, collecting a couple ml's already. Action:    Introduced self & ensured name & lactation # is on whiteboard in room. Reviewed breast pump education and answered all questions. MOB encouraged to call for lactation assistance as needed. Response:    MOB verbalized an understanding of education provided and will call for assistance as needed.

## 2023-10-09 VITALS
WEIGHT: 290 LBS | OXYGEN SATURATION: 100 % | DIASTOLIC BLOOD PRESSURE: 78 MMHG | RESPIRATION RATE: 16 BRPM | TEMPERATURE: 98.2 F | HEIGHT: 66 IN | HEART RATE: 83 BPM | SYSTOLIC BLOOD PRESSURE: 141 MMHG | BODY MASS INDEX: 46.61 KG/M2

## 2023-10-09 PROCEDURE — 6370000000 HC RX 637 (ALT 250 FOR IP): Performed by: OBSTETRICS & GYNECOLOGY

## 2023-10-09 RX ORDER — IBUPROFEN 600 MG/1
600 TABLET ORAL EVERY 6 HOURS PRN
Qty: 25 TABLET | Refills: 1 | Status: SHIPPED | OUTPATIENT
Start: 2023-10-09

## 2023-10-09 RX ORDER — IBUPROFEN 600 MG/1
600 TABLET ORAL EVERY 6 HOURS PRN
Qty: 25 TABLET | Refills: 1 | Status: SHIPPED | OUTPATIENT
Start: 2023-10-09 | End: 2023-10-09 | Stop reason: SDUPTHER

## 2023-10-09 RX ORDER — OXYCODONE HYDROCHLORIDE 10 MG/1
10 TABLET ORAL EVERY 4 HOURS PRN
Qty: 10 TABLET | Refills: 0 | Status: SHIPPED | OUTPATIENT
Start: 2023-10-09 | End: 2023-10-12

## 2023-10-09 RX ORDER — OXYCODONE HYDROCHLORIDE 5 MG/1
5 TABLET ORAL EVERY 4 HOURS PRN
Qty: 10 TABLET | Refills: 0 | Status: SHIPPED | OUTPATIENT
Start: 2023-10-09 | End: 2023-10-12

## 2023-10-09 RX ADMIN — DOCUSATE SODIUM 100 MG: 100 CAPSULE, LIQUID FILLED ORAL at 08:17

## 2023-10-09 RX ADMIN — HYDROXYZINE PAMOATE 25 MG: 25 CAPSULE ORAL at 04:56

## 2023-10-09 RX ADMIN — IBUPROFEN 600 MG: 600 TABLET, FILM COATED ORAL at 08:17

## 2023-10-09 RX ADMIN — OXYCODONE HYDROCHLORIDE 10 MG: 5 TABLET ORAL at 14:33

## 2023-10-09 RX ADMIN — ACETAMINOPHEN 1000 MG: 500 TABLET ORAL at 04:50

## 2023-10-09 RX ADMIN — IBUPROFEN 600 MG: 600 TABLET, FILM COATED ORAL at 01:05

## 2023-10-09 RX ADMIN — IBUPROFEN 600 MG: 600 TABLET, FILM COATED ORAL at 17:06

## 2023-10-09 RX ADMIN — OXYCODONE HYDROCHLORIDE 10 MG: 5 TABLET ORAL at 10:15

## 2023-10-09 RX ADMIN — OXYCODONE HYDROCHLORIDE 10 MG: 5 TABLET ORAL at 01:07

## 2023-10-09 RX ADMIN — OXYCODONE HYDROCHLORIDE 10 MG: 5 TABLET ORAL at 06:15

## 2023-10-09 RX ADMIN — PRENATAL WITH FERROUS FUM AND FOLIC ACID 1 TABLET: 3080; 920; 120; 400; 22; 1.84; 3; 20; 10; 1; 12; 200; 27; 25; 2 TABLET ORAL at 08:17

## 2023-10-09 RX ADMIN — OXYCODONE HYDROCHLORIDE 10 MG: 5 TABLET ORAL at 18:50

## 2023-10-09 RX ADMIN — ACETAMINOPHEN 1000 MG: 500 TABLET ORAL at 12:58

## 2023-10-09 ASSESSMENT — PAIN SCALES - GENERAL
PAINLEVEL_OUTOF10: 3
PAINLEVEL_OUTOF10: 5
PAINLEVEL_OUTOF10: 7
PAINLEVEL_OUTOF10: 3
PAINLEVEL_OUTOF10: 5
PAINLEVEL_OUTOF10: 6
PAINLEVEL_OUTOF10: 6

## 2023-10-09 ASSESSMENT — PAIN DESCRIPTION - DESCRIPTORS
DESCRIPTORS: ACHING
DESCRIPTORS: CRAMPING
DESCRIPTORS: SORE;SHARP;BURNING
DESCRIPTORS: DISCOMFORT;SORE;CRAMPING

## 2023-10-09 ASSESSMENT — PAIN DESCRIPTION - LOCATION
LOCATION: ABDOMEN
LOCATION: ABDOMEN;INCISION
LOCATION: ABDOMEN
LOCATION: ABDOMEN;INCISION;BACK
LOCATION: ABDOMEN

## 2023-10-09 ASSESSMENT — PAIN - FUNCTIONAL ASSESSMENT
PAIN_FUNCTIONAL_ASSESSMENT: ACTIVITIES ARE NOT PREVENTED
PAIN_FUNCTIONAL_ASSESSMENT: ACTIVITIES ARE NOT PREVENTED

## 2023-10-09 ASSESSMENT — PAIN DESCRIPTION - ORIENTATION: ORIENTATION: LOWER

## 2023-10-09 NOTE — FLOWSHEET NOTE
Care assumed. Report received. Introduced self to patient. Name and number written on whiteboard. Patient states comfortable at present .  Denies needs

## 2023-10-09 NOTE — DISCHARGE SUMMARY
Obstetrical Discharge Form    Gestational Age:39w5d    Antepartum complications: none    Date of Delivery: 10/6/23    Type of Delivery:  for NRFWB    Delivered By:  SM         Baby:   Information for the patient's :  Daxroyal Wu [[id number]]      Anesthesia: Epidural    Intrapartum complications: None    Postpartum complications: none    Condition: good    Discharge Medication:      Medication List        ASK your doctor about these medications      PRENATAL PO               Discharge Date: 10/9/23    Plan:   Follow up in 6 week(s)     CF

## 2023-10-09 NOTE — LACTATION NOTE
This note was copied from a baby's chart. Lactation Progress Note      Data:   F/U with multip 3PO. [facility] 39[facility]5 [facility]. Infant currently with UVC, ABX, PO.   MOB is pumping at time of consult with hospital grade pump. Breast are beginning to fill and collecting larger volumes this morning. MOB denies questions regarding pumping, and has supplies needed. MOB would like lactation to assist with direct breastfeeding when infant is able to go back to breast.  MD note:    Plan to transition to mother's room today. Action: Breastfeeding and pumping education reviewed. Encouraged to pump q3h x15 minutes, using premie+ setting. Reviewed when to switch to standard pumping setting. Encouraged breast massage and hand expression to support pumping sessions. Encouraged STS with baby when able, and offered to assist with breastfeeding when baby is stable and able to go to the breast. Encouraged to ensure she is pumping a minimum of 8x in a 24 hour period to protect and promote a good milk supply. Name and number on whiteobard. Encouraged to call for f/u support and assistance as needed. MOB was also provided with her insurance breast pump brand name Jalyn. Instruction given on use. Response: MOB verbalizes understanding of bf education that was provided. Comfortable with pumping at this time. Will call for f/u care as needed.

## 2023-10-09 NOTE — PLAN OF CARE
Problem: Pain  Goal: Verbalizes/displays adequate comfort level or baseline comfort level  10/9/2023 0726 by Guillermo Easley RN  Outcome: Completed  10/9/2023 0725 by Guillermo Easley RN  Outcome: Progressing  10/9/2023 0239 by Lela Kim RN  Outcome: Progressing  10/9/2023 0118 by Estuardo Trujillo RN  Outcome: Progressing     Problem: Postpartum  Goal: Experiences normal postpartum course  Description:  Postpartum OB-Pregnancy care plan goal which identifies if the mother is experiencing a normal postpartum course  10/9/2023 0726 by Guillermo Easley RN  Outcome: Completed  10/9/2023 0725 by Guillermo Easley RN  Outcome: Progressing  10/9/2023 0239 by Lela Kim RN  Outcome: Progressing  10/9/2023 0118 by Estuardo Trujillo RN  Outcome: Progressing  Goal: Appropriate maternal -  bonding  Description:  Postpartum OB-Pregnancy care plan goal which identifies if the mother and  are bonding appropriately  10/9/2023 0726 by Guillermo Easley RN  Outcome: Completed  10/9/2023 0725 by Guillermo Easley RN  Outcome: Progressing  10/9/2023 0239 by Lela Kim RN  Outcome: Progressing  10/9/2023 0118 by Estuardo Trujillo RN  Outcome: Progressing  Goal: Establishment of infant feeding pattern  Description:  Postpartum OB-Pregnancy care plan goal which identifies if the mother is establishing a feeding pattern with their   10/9/2023 0726 by Guillermo Easley RN  Outcome: Completed  10/9/2023 0725 by Guillermo Easley RN  Outcome: Progressing  10/9/2023 0239 by Lela Kim RN  Outcome: Progressing  10/9/2023 0118 by Estuardo Trujillo RN  Outcome: Progressing  Goal: Incisions, wounds, or drain sites healing without S/S of infection  10/9/2023 0726 by Guillermo Easley RN  Outcome: Completed  10/9/2023 0725 by Guillermo Easley RN  Outcome: Progressing  10/9/2023 0239 by Lela Kim RN  Outcome: Progressing

## 2023-10-09 NOTE — PLAN OF CARE
Problem: Pain  Goal: Verbalizes/displays adequate comfort level or baseline comfort level  Outcome: Progressing     Problem: Postpartum  Goal: Experiences normal postpartum course  Description:  Postpartum OB-Pregnancy care plan goal which identifies if the mother is experiencing a normal postpartum course  Outcome: Progressing  Goal: Appropriate maternal -  bonding  Description:  Postpartum OB-Pregnancy care plan goal which identifies if the mother and  are bonding appropriately  Outcome: Progressing  Goal: Establishment of infant feeding pattern  Description:  Postpartum OB-Pregnancy care plan goal which identifies if the mother is establishing a feeding pattern with their   Outcome: Progressing     Problem: Infection - Adult  Goal: Absence of infection at discharge  Outcome: Progressing     Problem: Safety - Adult  Goal: Free from fall injury  Outcome: Progressing     Problem: Chronic Conditions and Co-morbidities  Goal: Patient's chronic conditions and co-morbidity symptoms are monitored and maintained or improved  Outcome: Progressing

## 2023-10-09 NOTE — PLAN OF CARE
Problem: Pain  Goal: Verbalizes/displays adequate comfort level or baseline comfort level  10/9/2023 0239 by Nusrat Molina RN  Outcome: Progressing  10/9/2023 0118 by Sharyle Mascot, RN  Outcome: Progressing     Problem: Postpartum  Goal: Experiences normal postpartum course  Description:  Postpartum OB-Pregnancy care plan goal which identifies if the mother is experiencing a normal postpartum course  10/9/2023 0239 by Nusrat Molina RN  Outcome: Progressing  10/9/2023 0118 by Sharyle Mascot, RN  Outcome: Progressing  Goal: Appropriate maternal -  bonding  Description:  Postpartum OB-Pregnancy care plan goal which identifies if the mother and  are bonding appropriately  10/9/2023 0239 by Nusrat Molina RN  Outcome: Progressing  10/9/2023 0118 by Sharyle Mascot, RN  Outcome: Progressing  Goal: Establishment of infant feeding pattern  Description:  Postpartum OB-Pregnancy care plan goal which identifies if the mother is establishing a feeding pattern with their   10/9/2023 0239 by Nusrat Molina RN  Outcome: Progressing  10/9/2023 0118 by Sharyle Mascot, RN  Outcome: Progressing  Goal: Incisions, wounds, or drain sites healing without S/S of infection  Outcome: Progressing     Problem: Infection - Adult  Goal: Absence of infection at discharge  10/9/2023 0239 by Nusrat Molina RN  Outcome: Progressing  10/9/2023 0118 by Sharyle Mascot, RN  Outcome: Progressing  Goal: Absence of infection during hospitalization  Outcome: Progressing  Goal: Absence of fever/infection during anticipated neutropenic period  Outcome: Progressing     Problem: Safety - Adult  Goal: Free from fall injury  10/9/2023 0239 by Nusrat Molina RN  Outcome: Progressing  10/9/2023 0118 by Sharyle Mascot, RN  Outcome: Progressing     Problem: Discharge Planning  Goal: Discharge to home or other facility with appropriate resources  Outcome: Progressing     Problem: Chronic Conditions and Co-morbidities  Goal: Patient's

## 2023-10-09 NOTE — PLAN OF CARE
Problem: Pain  Goal: Verbalizes/displays adequate comfort level or baseline comfort level  10/9/2023 0725 by Bravo Escobedo RN  Outcome: Progressing  10/9/2023 0239 by Jose Martin Kessler RN  Outcome: Progressing  10/9/2023 0118 by Cici Jones RN  Outcome: Progressing     Problem: Postpartum  Goal: Experiences normal postpartum course  Description:  Postpartum OB-Pregnancy care plan goal which identifies if the mother is experiencing a normal postpartum course  10/9/2023 0725 by Bravo Escobedo RN  Outcome: Progressing  10/9/2023 0239 by Jose Martin Kessler RN  Outcome: Progressing  10/9/2023 0118 by Cici Jones RN  Outcome: Progressing  Goal: Appropriate maternal -  bonding  Description:  Postpartum OB-Pregnancy care plan goal which identifies if the mother and  are bonding appropriately  10/9/2023 0725 by Bravo Escobedo RN  Outcome: Progressing  10/9/2023 0239 by Jose Martin Kessler RN  Outcome: Progressing  10/9/2023 0118 by Cici Jones RN  Outcome: Progressing  Goal: Establishment of infant feeding pattern  Description:  Postpartum OB-Pregnancy care plan goal which identifies if the mother is establishing a feeding pattern with their   10/9/2023 0725 by Bravo Escobedo RN  Outcome: Progressing  10/9/2023 0239 by Jose Martin Kessler RN  Outcome: Progressing  10/9/2023 0118 by Cici Jones RN  Outcome: Progressing  Goal: Incisions, wounds, or drain sites healing without S/S of infection  10/9/2023 0725 by Bravo Escobedo RN  Outcome: Progressing  10/9/2023 0239 by Jose Martin Kessler RN  Outcome: Progressing

## 2023-10-09 NOTE — DISCHARGE INSTRUCTIONS
reduce swelling. Avoid lifting anything heavier than your baby or a gallon of milk. Avoid driving for two weeks or while taking narcotics. No sexual intercourse for 6 weeks, or until advised by your OB provider. Nothing in the vagina: intercourse, tampons or douching. Be prepared to discuss family planning at your follow up OB visit. If you feel tired and have a lack of energy, you may continue to take your prenatal vitamins. Nap when your baby naps to catch up on sleep. EMOTIONS    You may feel mcgee, sad, teary and overwhelmed. Contact your OB provider if you think you may be showing signs of postpartum depression. If your baby will not stop crying, contact another adult to help or place the baby in its crib on its back and take a break. Never shake your baby! INCISIONAL/LILLIAM CARE    Clean your incision in the shower with mild soap. After shower pat the incision area dry and allow it to be open to the air. If used, steri strips should be removed by 2 weeks. If you are discharged with staples in place, call your OB provider's office to schedule removal.  Vaginal bleeding will decrease in amount over the next few weeks. Cleanse your perineum from front to back using the lilliam bottle, after toileting, until bleeding stops. You will notice that as your activity increases, your flow may also increase. This is a sign that you need to rest more often. Call your OB provider if you are saturating more than one maxi pad in an hour and rest does not help. BREAST CARE    FOR BREASTFEEDING MOMS:    If you become engorged, feeding may be more difficult or painful for 1 to 2 days. You may find it helpful to hand express some milk so that the infant can latch more easily. While breastfeeding continue to take your prenatal vitamins as directed. Refer to the breastfeeding information in the discharge binder.     FOR NON-BREASTFEEDING MOMS:    You may apply ice packs to your breasts, over your bra, for 20

## 2025-01-18 ENCOUNTER — OFFICE VISIT (OUTPATIENT)
Age: 35
End: 2025-01-18

## 2025-01-18 VITALS
BODY MASS INDEX: 37.01 KG/M2 | OXYGEN SATURATION: 98 % | SYSTOLIC BLOOD PRESSURE: 111 MMHG | HEART RATE: 96 BPM | WEIGHT: 229.3 LBS | TEMPERATURE: 97.8 F | DIASTOLIC BLOOD PRESSURE: 80 MMHG

## 2025-01-18 DIAGNOSIS — J02.9 SORE THROAT: Primary | ICD-10-CM

## 2025-01-18 DIAGNOSIS — J02.0 STREPTOCOCCAL PHARYNGITIS: ICD-10-CM

## 2025-01-18 LAB — S PYO AG THROAT QL: POSITIVE

## 2025-01-18 RX ORDER — PENICILLIN V POTASSIUM 500 MG/1
500 TABLET, FILM COATED ORAL 3 TIMES DAILY
Qty: 30 TABLET | Refills: 0 | Status: SHIPPED | OUTPATIENT
Start: 2025-01-18 | End: 2025-01-28

## 2025-01-18 ASSESSMENT — ENCOUNTER SYMPTOMS
VOMITING: 0
WHEEZING: 0
RHINORRHEA: 0
SORE THROAT: 1
COUGH: 0
ABDOMINAL PAIN: 0
DIARRHEA: 0
NAUSEA: 0
SHORTNESS OF BREATH: 0

## 2025-01-18 NOTE — PROGRESS NOTES
Guadalupe Santa (:  1990) is a 34 y.o. female,New patient, here for evaluation of the following chief complaint(s):  Pharyngitis      ASSESSMENT/PLAN:  1. Sore throat    - POCT rapid strep A    2. Streptococcal pharyngitis    - penicillin v potassium (VEETID) 500 MG tablet; Take 1 tablet by mouth 3 times daily for 10 days  Dispense: 30 tablet; Refill: 0     -instruction in AVS.rest and increase fluid intake,take Tylenol as needed.  Return if symptoms worsen or fail to improve.    SUBJECTIVE/OBJECTIVE:    History provided by:  Patient  Pharyngitis  Severity:  Mild  Onset quality:  Sudden  Duration:  1 day  Timing:  Constant  Progression:  Unchanged  Chronicity:  New  Associated symptoms: sore throat    Associated symptoms: no abdominal pain, no chest pain, no congestion, no cough, no diarrhea, no ear pain, no fatigue, no fever, no headaches, no myalgias, no nausea, no rash, no rhinorrhea, no shortness of breath, no vomiting and no wheezing        Vitals:    25 1449   BP: 111/80   Pulse: 96   Temp: 97.8 °F (36.6 °C)   SpO2: 98%   Weight: 104 kg (229 lb 4.8 oz)       Review of Systems   Constitutional:  Negative for fatigue and fever.   HENT:  Positive for sore throat. Negative for congestion, ear pain and rhinorrhea.    Respiratory:  Negative for cough, shortness of breath and wheezing.    Cardiovascular:  Negative for chest pain.   Gastrointestinal:  Negative for abdominal pain, diarrhea, nausea and vomiting.   Musculoskeletal:  Negative for myalgias.   Skin:  Negative for rash.   Neurological:  Negative for headaches.       Physical Exam  Constitutional:       General: She is not in acute distress.  HENT:      Nose: No congestion or rhinorrhea.      Mouth/Throat:      Mouth: Mucous membranes are moist.      Pharynx: Posterior oropharyngeal erythema present. No oropharyngeal exudate.   Eyes:      Conjunctiva/sclera: Conjunctivae normal.      Pupils: Pupils are equal, round, and reactive to light.

## (undated) DEVICE — BLADE CLIPPER GEN PURP NS

## (undated) DEVICE — SUTURE MAXON 0 L36IN GRN ABSRB GS-24 L40MM 1/2 CIR REINF 8886628761

## (undated) DEVICE — SUTURE MCRYL SZ 0 L18IN ABSRB VLT L36MM CT-1 1/2 CIR Y740D

## (undated) DEVICE — SUTURE VCRL SZ 3-0 L36IN ABSRB UD L36MM CT-1 1/2 CIR J944H

## (undated) DEVICE — PAD,NON-ADHERENT,3X8,STERILE,LF,1/PK: Brand: MEDLINE

## (undated) DEVICE — SPONGE LAP W18XL18IN WHT COT 4 PLY FLD STRUNG RADPQ DISP ST 2 PER PACK

## (undated) DEVICE — TRAY URIN CATH 16FR DRNGE BG STATLOK STBL DEV F SURSTP

## (undated) DEVICE — DRESSING COMP IS W4XL10IN PD W2XL8IN CNTCT LAYR ADH

## (undated) DEVICE — SUTURE VCRL SZ 0 L36IN ABSRB UD L36MM CT-1 1/2 CIR J946H

## (undated) DEVICE — GARMENT COMPR L FOR 23IN CALF FLOTRN

## (undated) DEVICE — SOLUTION IV IRRIG POUR BRL 0.9% SODIUM CHL 2F7124

## (undated) DEVICE — GLOVE SURG SZ 65 THK91MIL LTX FREE SYN POLYISOPRENE

## (undated) DEVICE — Device

## (undated) DEVICE — Z INACTIVE NO ACTIVE SUPPLIER APPLICATOR MEDICATED 26 CC TINT HI-LITE ORNG STRL CHLORAPREP

## (undated) DEVICE — ADHESIVE SKIN CLSR 0.7ML TOP DERMBND ADV

## (undated) DEVICE — 3M™ STERI-STRIP™ COMPOUND BENZOIN TINCTURE 40 BAGS/CARTON 4 CARTONS/CASE C1544: Brand: 3M™ STERI-STRIP™

## (undated) DEVICE — GLOVE ORANGE PI 7   MSG9070